# Patient Record
Sex: FEMALE | Race: BLACK OR AFRICAN AMERICAN | NOT HISPANIC OR LATINO | Employment: FULL TIME | URBAN - METROPOLITAN AREA
[De-identification: names, ages, dates, MRNs, and addresses within clinical notes are randomized per-mention and may not be internally consistent; named-entity substitution may affect disease eponyms.]

---

## 2017-01-17 ENCOUNTER — HISTORICAL (OUTPATIENT)
Dept: RADIOLOGY | Facility: HOSPITAL | Age: 47
End: 2017-01-17

## 2017-08-15 ENCOUNTER — HISTORICAL (OUTPATIENT)
Dept: ADMINISTRATIVE | Facility: HOSPITAL | Age: 47
End: 2017-08-15

## 2017-08-15 LAB
APPEARANCE, UA: ABNORMAL
BACTERIA #/AREA URNS AUTO: ABNORMAL /HPF
BILIRUB UR QL STRIP: NEGATIVE
COLOR UR: YELLOW
GLUCOSE (UA): NEGATIVE
HGB UR QL STRIP: ABNORMAL
KETONES UR QL STRIP: NEGATIVE
LEUKOCYTE ESTERASE UR QL STRIP: ABNORMAL
NITRITE UR QL STRIP.AUTO: NEGATIVE
PH UR STRIP: 5.5 [PH] (ref 5–9)
PROT UR QL STRIP: NEGATIVE
RBC #/AREA URNS HPF: 28 /HPF (ref 0–2)
SP GR UR STRIP: 1.02 (ref 1–1.03)
SQUAMOUS EPITHELIAL, UA: 30 /HPF (ref 0–4)
UROBILINOGEN UR STRIP-ACNC: 0.2
WBC #/AREA URNS AUTO: 19 /HPF (ref 0–3)

## 2017-09-05 ENCOUNTER — HISTORICAL (OUTPATIENT)
Dept: ADMINISTRATIVE | Facility: HOSPITAL | Age: 47
End: 2017-09-05

## 2017-09-05 LAB
APPEARANCE, UA: CLEAR
BACTERIA SPEC CULT: ABNORMAL /HPF
BILIRUB UR QL STRIP: NEGATIVE
COLOR UR: YELLOW
GLUCOSE (UA): NEGATIVE
HGB UR QL STRIP: NEGATIVE
KETONES UR QL STRIP: NEGATIVE
LEUKOCYTE ESTERASE UR QL STRIP: NEGATIVE
NITRITE UR QL STRIP: NEGATIVE
PH UR STRIP: 6.5 [PH] (ref 5–9)
PROT UR QL STRIP: NEGATIVE
RBC #/AREA URNS HPF: 6 /HPF (ref 0–2)
SP GR UR STRIP: 1.03 (ref 1–1.03)
SQUAMOUS EPITHELIAL, UA: ABNORMAL
UROBILINOGEN UR STRIP-ACNC: 1
WBC #/AREA URNS HPF: ABNORMAL /[HPF]

## 2018-01-02 ENCOUNTER — HISTORICAL (OUTPATIENT)
Dept: ADMINISTRATIVE | Facility: HOSPITAL | Age: 48
End: 2018-01-02

## 2018-02-06 ENCOUNTER — HISTORICAL (OUTPATIENT)
Dept: PREADMISSION TESTING | Facility: HOSPITAL | Age: 48
End: 2018-02-06

## 2018-02-06 LAB
BUN SERPL-MCNC: 10 MG/DL (ref 7–18)
CALCIUM SERPL-MCNC: 9 MG/DL (ref 8.5–10.1)
CHLORIDE SERPL-SCNC: 107 MMOL/L (ref 98–107)
CO2 SERPL-SCNC: 26 MMOL/L (ref 21–32)
CREAT SERPL-MCNC: 0.62 MG/DL (ref 0.55–1.02)
GLUCOSE SERPL-MCNC: 77 MG/DL (ref 74–106)
POTASSIUM SERPL-SCNC: 4.2 MMOL/L (ref 3.5–5.1)
SODIUM SERPL-SCNC: 139 MMOL/L (ref 136–145)

## 2018-02-19 ENCOUNTER — HISTORICAL (OUTPATIENT)
Dept: SURGERY | Facility: HOSPITAL | Age: 48
End: 2018-02-19

## 2018-02-19 LAB — POC BETA-HCG (QUAL): NEGATIVE

## 2018-07-10 ENCOUNTER — HISTORICAL (OUTPATIENT)
Dept: ADMINISTRATIVE | Facility: HOSPITAL | Age: 48
End: 2018-07-10

## 2018-07-10 LAB
APPEARANCE, UA: CLEAR
BACTERIA #/AREA URNS AUTO: ABNORMAL /HPF
BILIRUB UR QL STRIP: NEGATIVE
COLOR UR: ABNORMAL
GLUCOSE (UA): NEGATIVE
HGB UR QL STRIP: NEGATIVE
KETONES UR QL STRIP: ABNORMAL
LEUKOCYTE ESTERASE UR QL STRIP: ABNORMAL
NITRITE UR QL STRIP.AUTO: NEGATIVE
PH UR STRIP: 5 [PH] (ref 5–9)
PROT UR QL STRIP: ABNORMAL
RBC #/AREA URNS HPF: ABNORMAL /[HPF]
SP GR UR STRIP: 1.02 (ref 1–1.03)
SQUAMOUS EPITHELIAL, UA: ABNORMAL
UROBILINOGEN UR STRIP-ACNC: 1
WBC #/AREA URNS AUTO: 5 /HPF (ref 0–3)

## 2018-07-12 LAB — FINAL CULTURE: NO GROWTH

## 2018-07-23 ENCOUNTER — HISTORICAL (OUTPATIENT)
Dept: ADMINISTRATIVE | Facility: HOSPITAL | Age: 48
End: 2018-07-23

## 2018-07-23 LAB
APPEARANCE, UA: CLEAR
BACTERIA #/AREA URNS AUTO: ABNORMAL /HPF
BILIRUB UR QL STRIP: NEGATIVE
COLOR UR: YELLOW
GLUCOSE (UA): NEGATIVE
HGB UR QL STRIP: ABNORMAL
KETONES UR QL STRIP: NEGATIVE
LEUKOCYTE ESTERASE UR QL STRIP: ABNORMAL
NITRITE UR QL STRIP.AUTO: NEGATIVE
PH UR STRIP: 5.5 [PH] (ref 5–9)
PROT UR QL STRIP: NEGATIVE
RBC #/AREA URNS HPF: ABNORMAL /[HPF]
SP GR UR STRIP: 1.02 (ref 1–1.03)
SQUAMOUS EPITHELIAL, UA: ABNORMAL
UROBILINOGEN UR STRIP-ACNC: 0.2
WBC #/AREA URNS AUTO: ABNORMAL /HPF (ref 0–3)

## 2018-07-25 LAB — FINAL CULTURE: NORMAL

## 2019-02-19 ENCOUNTER — HISTORICAL (OUTPATIENT)
Dept: ADMINISTRATIVE | Facility: HOSPITAL | Age: 49
End: 2019-02-19

## 2019-05-02 ENCOUNTER — HISTORICAL (OUTPATIENT)
Dept: RADIOLOGY | Facility: HOSPITAL | Age: 49
End: 2019-05-02

## 2019-10-30 ENCOUNTER — HISTORICAL (OUTPATIENT)
Dept: ADMINISTRATIVE | Facility: HOSPITAL | Age: 49
End: 2019-10-30

## 2022-05-02 NOTE — HISTORICAL [facility]
This is a historical note converted from Kate. Formatting and pictures may have been removed.  Please reference Kate for original formatting and attached multimedia. Chief Complaint  Pain right shoulder/arm  History of Present Illness  This 47-year-old comes in complaining of right shoulder pain. ?She has had intermittent pain but it became worse after?painting. ?She gives a good description of impingement tendinitis and some weakness.? She is also complaining of right carpal tunnel syndrome.? She is right-hand dominant.? She is a hairdresser.  Review of Systems  Constitutional: No fever, weakness, or fatigue.  Ear/Nose/Mouth/Throat: No nasal congestion or sore throat.  Respiratory: No shortness of breath or cough.  Cardiovascular: No chest pain, palpitations, or peripheral edema.  Gastrointestinal: No nausea, vomiting, or abdominal pain.  Genitourinary: No dysuria.  Musculoskeletal: See current complaints  Integumentary: Negative.  Physical Exam  Vitals & Measurements  HR:?89?(Peripheral)? BP:?138/83?  HT:?173?cm? HT:?173?cm? WT:?95.3?kg? WT:?95.3?kg? BMI:?31.84?  Physical examination shows that she is tender over the supraspinatus insertion as well as the AC joint.? Forward flexion is 120? and abduction is 90?. ?She has positive impingement tendinitis symptoms.? She has weakness in external rotation but not abduction.?  She has no biceps tendon pain.? Examination of her right hand shows that she has thenar atrophy.? She has a positive Phalens and positive Tinels for right carpal tunnel syndrome.? She has fair  strength.  ?  AP, axillary lateral, and scapular outlet view of the right shoulder shows that the patient has?a type II acromion with subacromial spurring as well as AC joint arthritis.  Assessment/Plan  1.?Right shoulder pain  ?The findings were reviewed with the patient and she expressed understanding.? Discussed options for treatment.? The patient opted for an injection.? The patients right  subacromial space was injected with dexamethasone 1 mL under sterile conditions. ?The patient tolerated the injection without difficulty. The patient requested a muscle relaxant.? I will see her back in 1 week for recheck. ?If her strength does not improve I would recommend an MRI.? She requested no pain medication.? She is instructed to call if she has any problems prior to her next appointment.  Ordered:  cyclobenzaprine, 5 mg = 1 tab(s), Oral, TID, X 14 day(s), # 42 tab(s), 0 Refill(s), Pharmacy: [id number]  dexamethasone, 4 mg, Intra-Articular, Once, first dose 01/02/18 14:00:00 CST, stop date 01/02/18 14:00:00 CST, 24  asp/inj jnt/bursa, major [id number] , 01/02/18 13:49:00 CST, RT, [facility] [place], Routine, 01/02/18 13:49:00 CST  Clinic Follow up, *Est. 01/09/18 3:00:00 CST, Order for future visit, Right shoulder pain  Impingement syndrome of right shoulder  Sprain of right rotator cuff capsule, initial encounter  Right carpal tunnel syndrome, [facility] [place]  Office/Outpatient Visit Level 3 Established [id number] , Right shoulder pain  Impingement syndrome of right shoulder  Sprain of right rotator cuff capsule, initial encounter  Right carpal tunnel syndrome, [facility] [place], 01/02/18 13:49:00 CST  XR Shoulder Right Minimum 2 Views, Routine, 01/02/18 11:34:00 CST, Pain, None, Stretcher, Patient Has IV?, Rad Type, Right shoulder pain, Not Scheduled, 01/02/18 11:34:00 CST  ?  2.?Impingement syndrome of right shoulder  Ordered:  cyclobenzaprine, 5 mg = 1 tab(s), Oral, TID, X 14 day(s), # 42 tab(s), 0 Refill(s), Pharmacy: [id number]  dexamethasone, 4 mg, Intra-Articular, Once, first dose 01/02/18 14:00:00 CST, stop date 01/02/18 14:00:00 CST, 24  asp/inj jnt/bursa, major [id number] PC, 01/02/18 13:49:00 CST, RT, [facility], Routine, 01/02/18 13:49:00 CST  Clinic Follow up, *Est. 01/09/18 3:00:00 CST, Order for future visit, Right shoulder pain  Impingement syndrome of right  shoulder  Sprain of right rotator cuff capsule, initial encounter  Right carpal tunnel syndrome, [facility] [place]  Office/Outpatient Visit Level 3 Established [id number] PC, Right shoulder pain  Impingement syndrome of right shoulder  Sprain of right rotator cuff capsule, initial encounter  Right carpal tunnel syndrome, [facility] [place], 01/02/18 13:49:00 CST  ?  3.?Sprain of right rotator cuff capsule, initial encounter  Ordered:  cyclobenzaprine, 5 mg = 1 tab(s), Oral, TID, X 14 day(s), # 42 tab(s), 0 Refill(s), Pharmacy: [facility] #[id number]  dexamethasone, 4 mg, Intra-Articular, Once, first dose 01/02/18 14:00:00 CST, stop date 01/02/18 14:00:00 CST, 24  asp/inj jnt/bursa, major [id number] , 01/02/18 13:49:00 CST, RT, [facility] [place], Routine, 01/02/18 13:49:00 CST  Clinic Follow up, *Est. 01/09/18 3:00:00 CST, Order for future visit, Right shoulder pain  Impingement syndrome of right shoulder  Sprain of right rotator cuff capsule, initial encounter  Right carpal tunnel syndrome, [facility] [place]  Office/Outpatient Visit Level 3 Established [id number] PC, Right shoulder pain  Impingement syndrome of right shoulder  Sprain of right rotator cuff capsule, initial encounter  Right carpal tunnel syndrome, [facility] [place], 01/02/18 13:49:00 CST  ?  4.?Right carpal tunnel syndrome  Ordered:  cyclobenzaprine, 5 mg = 1 tab(s), Oral, TID, X 14 day(s), # 42 tab(s), 0 Refill(s), Pharmacy: [facility] #[id number]  dexamethasone, 4 mg, Intra-Articular, Once, first dose 01/02/18 14:00:00 CST, stop date 01/02/18 14:00:00 CST, 24  asp/inj jnt/bursa, major [id number] PC, 01/02/18 13:49:00 CST, RT, [facility] [place], Routine, 01/02/18 13:49:00 CST  Clinic Follow up, *Est. 01/09/18 3:00:00 CST, Order for future visit, Right shoulder pain  Impingement syndrome of right shoulder  Sprain of right rotator cuff capsule, initial encounter  Right carpal tunnel syndrome, [facility]  Office/Outpatient Visit Level 3  Established [id number] PC, Right shoulder pain  Impingement syndrome of right shoulder  Sprain of right rotator cuff capsule, initial encounter  Right carpal tunnel syndrome, [facility], 01/02/18 13:49:00 CST  ?   Problem List/Past Medical History  Ongoing  Impingement syndrome of right shoulder  Obesity  Right carpal tunnel syndrome  Right shoulder pain  Sprain of right rotator cuff capsule, initial encounter  Trigger finger  Historical  Procedure/Surgical History  Release Right Hand Tendon, Open Approach (06/27/2016)  Release Trigger Finger Or Thumb (Right) (06/27/2016)  Tendon sheath incision (eg, for trigger finger) (06/27/2016)  gastric sleeve (01/01/2013)  breast reduction (01/01/1998)  bunionectomy (01/01/1990)  liposcution  Medications  aspirin 81 mg oral tablet, 81 mg= 1 tab(s), Oral, Daily  CeleBREX 200 mg oral capsule, 200 mg= 1 cap(s), Oral, BID  dexamethasone, 4 mg, Intra-Articular, Once  Flexeril 5 mg oral tablet, 5 mg= 1 tab(s), Oral, TID  hydrochlorothiazide-triamterene 25 mg-37.5 mg oral capsule, 1 cap(s), Oral, Daily  PriLOSEC, 20 mg, Oral, Daily  Allergies  No Known Allergies  Social History  Alcohol - 06/21/2016  Current, Liquor, 1-2 times per month  Employment/School - 06/21/2016  Employed, Work/School description: . Highest education level: High school.  Home/Environment - 06/21/2016  Lives with Spouse. Living situation: Home/Independent. Home equipment: hearing aid. Alcohol abuse in household: No. Substance abuse in household: No. Smoker in household: No. Injuries/Abuse/Neglect in household: No.  Tobacco - 06/21/2016  Former smoker, Cigarettes  Family History  Pancreatic cancer: Father.

## 2022-05-02 NOTE — HISTORICAL [facility]
This is a historical note converted from Kate. Formatting and pictures may have been removed.  Please reference Kate for original formatting and attached multimedia. Chief Complaint  Est patient here c/o agata knee pain Left is worse and Left hip pain that goes into groin. No injury. Xrays today.  History of Present Illness  This 49-year-old is complaining of primarily left hip pain.? She states that she also has some mild bilateral knee pain but really her left pain and some more severe?issue.? The patient is complaining of pain in her groin that radiates down the medial side of her leg.? She continues to work as a stylist.  Review of Systems  Constitutional: No fever, weakness, or fatigue.  Ear/Nose/Mouth/Throat: No nasal congestion or sore throat.  Respiratory: No shortness of breath or cough.  Cardiovascular: No chest pain, palpitations, or peripheral edema.  Gastrointestinal: No nausea, vomiting, or abdominal pain.  Genitourinary: No dysuria.  Musculoskeletal: See current complaints  Integumentary: Negative.  Physical Exam  Vitals & Measurements  HR:?70(Peripheral)? BP:?124/84?  HT:?172?cm? WT:?98.2?kg? BMI:?33.19?  Physical examination shows that the patient walks with a minimally antalgic gait.? Examination of her knee show that they are cool to the touch.? She has no effusion. ?Range of motion of both knees 0-120 degrees.? She has mild patellofemoral crepitance.? She has no evidence of ligamentous laxity or meniscal pathology.? She has good patella tracking.? She is motor and sensory intact.? Examination of her left hip shows that the patients range of motion is flexion of 90 degrees,?extension of 0 degrees,?abduction of 40 degrees,?internal rotation of 0 degrees and external rotation of 30 degrees.? She has pain with internal rotation and flexion.? She also has pain with abduction.? She has no evidence of instability.? She appears to be motor and sensory intact.  ?  Standing AP, lateral, sunrise, and  tunnel views show that the patient has a flat?tibial slope but otherwise has no evidence of acute or chronic bony pathology in her knees.  ?  Standing AP pelvis with?lateral of the left hip shows that the patient has true femoral acetabular impingement?with?very large osteophyte on the left acetabulum that is causing erosions in the femoral head.? On the right acetabulum she has a smaller osteophyte and also has a broken osteophyte?in this area.? Changes on the femoral head are minimal.  Assessment/Plan  1.?Femoral acetabular impingement?M25.859  ? The findings were reviewed with the patient and she expressed understanding.? I think this is a true clinically significant case of femoral acetabular impingement. ?I would recommend?referral to my partner Dr. Louis for evaluation and treatment. ?The patient is in agreement.? She is instructed to call if she needs anything further.  Ordered:  1036F Current tobacco non-user, 02/19/19 15:44:00 CST  1125F Pain severity quantified, pain present, 02/19/19 15:44:00 CST  1170F Functional Status Assessment, 02/19/19 15:44:00 CST  3008F Body Mass Index (BMI), documented, 02/19/19 15:44:00 CST  3074F Most recent systolic blood pressure, less than 130 mm Hg, 02/19/19 15:44:00 CST  3079F Most recent diastolic blood pressure, 80 - 89 mm Hg, 02/19/19 15:44:00 CST  3725F Screening for depression performed, 02/19/19 15:44:00 CST  Office/Outpatient Visit Level 3 Established 98129 PC, Femoral acetabular impingement  Left hip pain  Left knee pain  Right knee pain, [facility], 02/19/19 15:44:00 CST  ?  2.?Left hip pain?M25.552  Ordered:  1036F Current tobacco non-user, 02/19/19 15:44:00 CST  1125F Pain severity quantified, pain present, 02/19/19 15:44:00 CST  1170F Functional Status Assessment, 02/19/19 15:44:00 CST  3008F Body Mass Index (BMI), documented, 02/19/19 15:44:00 CST  3074F Most recent systolic blood pressure, less than 130 mm Hg, 02/19/19 15:44:00 CST  3079F  Most recent diastolic blood pressure, 80 - 89 mm Hg, 02/19/19 15:44:00 CST  3725F Screening for depression performed, 02/19/19 15:44:00 CST  Office/Outpatient Visit Level 3 Established 93030 PC, Femoral acetabular impingement  Left hip pain  Left knee pain  Right knee pain, [facility], 02/19/19 15:44:00 CST  ?  3.?Left knee pain?M25.562  Ordered:  1036F Current tobacco non-user, 02/19/19 15:44:00 CST  1125F Pain severity quantified, pain present, 02/19/19 15:44:00 CST  1170F Functional Status Assessment, 02/19/19 15:44:00 CST  3008F Body Mass Index (BMI), documented, 02/19/19 15:44:00 CST  3074F Most recent systolic blood pressure, less than 130 mm Hg, 02/19/19 15:44:00 CST  3079F Most recent diastolic blood pressure, 80 - 89 mm Hg, 02/19/19 15:44:00 CST  3725F Screening for depression performed, 02/19/19 15:44:00 CST  Office/Outpatient Visit Level 3 Established 79427 PC, Femoral acetabular impingement  Left hip pain  Left knee pain  Right knee pain, [facility], 02/19/19 15:44:00 CST  ?  4.?Right knee pain?M25.561  Ordered:  1036F Current tobacco non-user, 02/19/19 15:44:00 CST  1125F Pain severity quantified, pain present, 02/19/19 15:44:00 CST  1170F Functional Status Assessment, 02/19/19 15:44:00 CST  3008F Body Mass Index (BMI), documented, 02/19/19 15:44:00 CST  3074F Most recent systolic blood pressure, less than 130 mm Hg, 02/19/19 15:44:00 CST  3079F Most recent diastolic blood pressure, 80 - 89 mm Hg, 02/19/19 15:44:00 CST  3725F Screening for depression performed, 02/19/19 15:44:00 CST  Office/Outpatient Visit Level 3 Established 31375 PC, Femoral acetabular impingement  Left hip pain  Left knee pain  Right knee pain, [facility], 02/19/19 15:44:00 CST  ?  Orders:  Clinic Follow-up PRN, 02/19/19 15:44:00 CST, Future Order, [facility]   Problem List/Past Medical History  Ongoing  Acid reflux  Arthritis  DVT - Deep vein thrombosis  Femoral acetabular  impingement  Hypertension  Impingement syndrome of right shoulder  Left hip pain  Obesity  Orthopedic aftercare  Right carpal tunnel syndrome  Right knee pain  Right shoulder pain  Sprain of right rotator cuff capsule, initial encounter  Trigger finger  Historical  No qualifying data  Procedure/Surgical History  Rotator Cuff Repair (Right) (02/19/2018)  Shoulder Decompression (Right) (02/19/2018)  Release Trigger Finger Or Thumb (Right) (06/27/2016)  gastric sleeve (01/01/2013)  breast reduction (01/01/1998)  bunionectomy (01/01/1990)  liposcution   Medications  aspirin 81 mg oral tablet, 81 mg= 1 tab(s), Oral, Daily  PriLOSEC, 20 mg, Oral, Daily  Allergies  No Known Allergies  Social History  Alcohol  Current, Liquor, 1-2 times per month, 06/21/2016  Employment/School  Employed, Work/School description: . Highest education level: High school., 06/21/2016  Home/Environment  Lives with Spouse. Living situation: Home/Independent. Home equipment: hearing aid. Alcohol abuse in household: No. Substance abuse in household: No. Smoker in household: No. Injuries/Abuse/Neglect in household: No., 06/21/2016  Tobacco  Former smoker, quit more than 30 days ago, No, 02/19/2019  Former smoker, Cigarettes, 06/21/2016  Family History  Hypertension.: Mother.  Pancreatic cancer: Father.  Health Maintenance  Health Maintenance  ???Pending?(in the next year)  ??? ??OverDue  ??? ? ? ?Diabetes Screening due??and every?  ??? ? ? ?Hypertension Management-BMP due??02/06/19??and every 1??year(s)  ??? ??Due?  ??? ? ? ?ADL Screening due??02/20/19??and every 1??year(s)  ??? ? ? ?Alcohol Misuse Screening due??02/20/19??and every 1??year(s)  ??? ? ? ?Hypertension Maintenance-Medication Prescribed due??02/20/19??and every 1??year(s)  ??? ? ? ?Hypertension Management-Education due??02/20/19??and every 1??year(s)  ??? ? ? ?Lipid Screening due??02/20/19??and every?  ??? ? ? ?Smoking Cessation due??02/20/19??Variable frequency  ??? ? ?  ?Tetanus Vaccine due??02/20/19??and every 10??year(s)  ??? ??Due In Future?  ??? ? ? ?Blood Pressure Screening not due until??02/19/20??and every 1??year(s)  ??? ? ? ?Body Mass Index Check not due until??02/19/20??and every 1??year(s)  ??? ? ? ?Depression Screening not due until??02/19/20??and every 1??year(s)  ??? ? ? ?Hypertension Management-Blood Pressure not due until??02/19/20??and every 1??year(s)  ??? ? ? ?Obesity Screening not due until??02/19/20??and every 1??year(s)  ???Satisfied?(in the past 1 year)  ??? ??Satisfied?  ??? ? ? ?Blood Pressure Screening on??02/19/19.??Satisfied by Jakobeg, Lauren L. L.  ??? ? ? ?Body Mass Index Check on??02/19/19.??Satisfied by Sarah, Lauren L. L.  ??? ? ? ?Cervical Cancer Screening on??08/20/18.??Satisfied by Solange Woodward  ??? ? ? ?Depression Screening on??02/19/19.??Satisfied by Sarah, Lauren L. L.  ??? ? ? ?Hypertension Management-Blood Pressure on??02/19/19.??Satisfied by Jakobeg, Lauren L. L.  ??? ? ? ?Influenza Vaccine on??02/19/19.??Satisfied by Nineryeg, Lauren L. L.  ??? ? ? ?Obesity Screening on??02/19/19.??Satisfied by Nineryeg, Lauren L. L.  ?  ?

## 2022-05-02 NOTE — HISTORICAL [facility]
This is a historical note converted from Kate. Formatting and pictures may have been removed.  Please reference Kate for original formatting and attached multimedia. Chief Complaint  PT HERE TODAY FOR LEFT SHOULDER PAIN, NO INJURY, ?X-RAY TODAY....CV  History of Present Illness  This 49-year-old comes in for her left shoulder?I have treated her for right rotator cuff?tear in the past. ?She is right-hand dominant. ?She works as a .? The patient states that she is now developing constant pain and some weakness.  Review of Systems  Constitutional: No fever, weakness, or fatigue.  Ear/Nose/Mouth/Throat: No nasal congestion or sore throat.  Respiratory: No shortness of breath or cough.  Cardiovascular: No chest pain, palpitations, or peripheral edema.  Gastrointestinal: No nausea, vomiting, or abdominal pain.  Genitourinary: No dysuria.  Musculoskeletal: See current complaints  Integumentary: Negative.  Physical Exam  Vitals & Measurements  T:?36.6? ?C (Oral)? HR:?65(Peripheral)? BP:?126/80?  HT:?172?cm? WT:?98.2?kg? BMI:?33.19?  Physical examination shows that she has palpable osteophytes at her AC joint.? Patient has significant crepitance with?range of motion and pain with?provocative testing for supraspinatus and infraspinatus impingement tendinitis.? She has pain with AC joint compression. ?She has pain along her biceps tendon. ?She has moderate weakness in abduction and external rotation.? Active range of motion is forward flexion of 90 degrees and abduction of 90 degrees.? Extension is 60 degrees.? Internal rotation is to L4. ?She appears to be motor and sensory intact. ?She has no evidence of instability.  ?  AP, axillary lateral, and scapular outlet view of the left shoulder shows that the patient has a type II acromion with no significant subacromial spurring. ?However she has significant AC joint arthritis with spurring and cystic changes.  Assessment/Plan  1.?Primary osteoarthritis, left  shoulder?M19.012  ?The findings were reviewed with the patient and she expressed understanding. ?We discussed options for treatment. ?The patient opted for an injection today.? The patients left subacromial space was injected with dexamethasone 1 mL under sterile conditions. ?The patient tolerated the injection without difficulty.? The patient will be sent for an MRI to rule out?rotator cuff tear. ?I will see her back after her MRI is?completed. ?She is instructed to call if she has any problems prior to her next appointment.  Ordered:  dexamethasone, 4 mg, Intra-Articular, Once, first dose 10/30/19 10:00:00 CDT, stop date 10/30/19 10:00:00 CDT  Clinic Follow up, *Est. 11/06/19 3:00:00 CST, Order for future visit, Primary osteoarthritis, left shoulder  Sprain of left rotator cuff capsule, [facility]  MRI Ext Upper Joint Left W/O Contrast, Routine, *Est. 10/31/19 3:00:00 CDT, Rotator Cuff Syndrome, None, Ambulatory, Patient Has IV?, Rad Type, Order for future visit, Primary osteoarthritis, left shoulder  Sprain of left rotator cuff capsule, Schedule this test, Outside Facility, *Est. 1...  Office/Outpatient Visit Level 3 [facility] [id number] [facility], Primary osteoarthritis, left shoulder  Sprain of left rotator cuff capsule, [facility], 10/30/19 9:53:00 CDT  ?  2.?Sprain of left rotator cuff capsule?S43.422A  Ordered:  dexamethasone, 4 mg, Intra-Articular, Once, first dose 10/30/19 10:00:00 CDT, stop date 10/30/19 10:00:00 CDT  Clinic Follow up, *Est. 11/06/19 3:00:00 CST, Order for future visit, Primary osteoarthritis, left shoulder  Sprain of left rotator cuff capsule, [facility]  MRI Ext Upper Joint Left W/O Contrast, Routine, *Est. 10/31/19 3:00:00 CDT, Rotator Cuff Syndrome, None, Ambulatory, Patient Has IV?, Rad Type, Order for future visit, Primary osteoarthritis, left shoulder  Sprain of left rotator cuff capsule, Schedule this test, Outside Facility, *Est. 1...  Office/Outpatient Visit Level 3 New  [id number] [facility], Primary osteoarthritis, left shoulder  Sprain of left rotator cuff capsule, [facility], 10/30/19 9:53:00 CDT  ?  Orders:  asp/inj jnt/bursa, major [id number] PC, 10/30/19 9:53:00 CDT, LT, [facility], Routine, 10/30/19 9:53:00 CDT  XR Shoulder Left Minimum 2 Views, Routine, 10/30/19 9:22:00 CDT, Pain, None, Stretcher, Patient Has IV?, Rad Type, Left shoulder pain, 10/30/19 9:22:00 CDT  Referrals  Clinic Follow up, *Est. 11/06/19 3:00:00 CST, Order for future visit, Primary osteoarthritis, left shoulder  [facility]   Problem List/Past Medical History  Ongoing  Acid reflux  Arthritis  DVT - Deep vein thrombosis  Eruption  Femoral acetabular impingement  Hypertension  Impingement syndrome of right shoulder  Left hip pain  Obesity  Orthopedic aftercare  Primary osteoarthritis, left shoulder  Right carpal tunnel syndrome  Right knee pain  Right shoulder pain  Sprain of left rotator cuff capsule  Sprain of right rotator cuff capsule, initial encounter  Trigger finger  Historical  No qualifying data  Procedure/Surgical History  Arthroscopy, shoulder, surgical; decompression of subacromial space with partial acromioplasty, with coracoacromial ligament (ie, arch) release, when performed (List separately in addition to code for primary procedure) (02/19/2018)  Arthroscopy, shoulder, surgical; with rotator cuff repair (02/19/2018)  Injection, anesthetic agent; brachial plexus, single (02/19/2018)  Introduction of Anesthetic Agent into Peripheral Nerves and Plexi, Percutaneous Approach (02/19/2018)  Release Right Shoulder Joint, Percutaneous Endoscopic Approach (02/19/2018)  Repair Right Shoulder Tendon, Percutaneous Endoscopic Approach (02/19/2018)  Rotator Cuff Repair (Right) (02/19/2018)  Shoulder Decompression (Right) (02/19/2018)  Release Right Hand Tendon, Open Approach (06/27/2016)  Release Trigger Finger Or Thumb (Right) (06/27/2016)  Tendon sheath  incision (eg, for trigger finger) (06/27/2016)  gastric sleeve (01/01/2013)  breast reduction (01/01/1998)  bunionectomy (01/01/1990)  liposcution   Medications  celecoxib 200 mg oral capsule  dexamethasone, 4 mg, Intra-Articular, Once  PriLOSEC, 20 mg, Oral, Daily  Zyrtec 10 mg oral tablet, 10 mg= 1 tab(s), Oral, Daily  Allergies  Adhesive Bandage?(Rash)  Social History  Abuse/Neglect  No, 10/30/2019  Alcohol  Current, Liquor, 1-2 times per month, 06/21/2016  Employment/School  Employed, Work/School description: . Highest education level: High school., 06/21/2016  Home/Environment  Lives with Spouse. Living situation: Home/Independent. Home equipment: hearing aid. Alcohol abuse in household: No. Substance abuse in household: No. Smoker in household: No. Injuries/Abuse/Neglect in household: No., 06/21/2016  Tobacco  Former smoker, quit more than 30 days ago, N/A, 10/30/2019  Family History  Hypertension.: Mother.  Pancreatic cancer: Father.  Health Maintenance  Health Maintenance  ???Pending?(in the next year)  ??? ??OverDue  ??? ? ? ?Diabetes Screening due??and every?  ??? ??Due?  ??? ? ? ?ADL Screening due??10/30/19??and every 1??year(s)  ??? ? ? ?Hypertension Maintenance-Medication Prescribed due??10/30/19??and every 1??year(s)  ??? ? ? ?Hypertension Management-Education due??10/30/19??and every 1??year(s)  ??? ? ? ?Influenza Vaccine due??10/30/19??and every?  ??? ? ? ?Tetanus Vaccine due??10/30/19??and every 10??year(s)  ??? ??Due In Future?  ??? ? ? ?Alcohol Misuse Screening not due until??01/01/20??and every 1??year(s)  ??? ? ? ?Obesity Screening not due until??01/01/20??and every 1??year(s)  ??? ? ? ?Depression Screening not due until??02/19/20??and every 1??year(s)  ??? ? ? ?Hypertension Management-BMP not due until??03/26/20??and every 1??year(s)  ??? ? ? ?Blood Pressure Screening not due until??10/29/20??and every 1??year(s)  ??? ? ? ?Body Mass Index Check not due until??10/29/20??and every  1??year(s)  ??? ? ? ?Hypertension Management-Blood Pressure not due until??10/29/20??and every 1??year(s)  ???Satisfied?(in the past 1 year)  ??? ??Satisfied?  ??? ? ? ?Alcohol Misuse Screening on??10/30/19.??Satisfied by Jud Phelps LPN  ??? ? ? ?Blood Pressure Screening on??10/30/19.??Satisfied by Jud Phelps LPN  ??? ? ? ?Body Mass Index Check on??10/30/19.??Satisfied by Jud Phelps LPN  ??? ? ? ?Depression Screening on??02/19/19.??Satisfied by Lauren Smith LAramis  ??? ? ? ?Hypertension Management-Blood Pressure on??10/30/19.??Satisfied by Jud Phelps LPN  ??? ? ? ?Influenza Vaccine on??02/19/19.??Satisfied by Lauren Smith LAramis LAramis  ??? ? ? ?Obesity Screening on??10/30/19.??Satisfied by Jud Phelps LPN  ?

## 2023-11-20 ENCOUNTER — OFFICE VISIT (OUTPATIENT)
Dept: ORTHOPEDICS | Facility: CLINIC | Age: 53
End: 2023-11-20
Payer: COMMERCIAL

## 2023-11-20 VITALS — HEIGHT: 69 IN | WEIGHT: 190 LBS | BODY MASS INDEX: 28.14 KG/M2

## 2023-11-20 DIAGNOSIS — M25.512 ACUTE PAIN OF LEFT SHOULDER: ICD-10-CM

## 2023-11-20 DIAGNOSIS — M75.122 COMPLETE TEAR OF LEFT ROTATOR CUFF, UNSPECIFIED WHETHER TRAUMATIC: ICD-10-CM

## 2023-11-20 DIAGNOSIS — M75.22 BICIPITAL TENDINITIS OF LEFT SHOULDER: ICD-10-CM

## 2023-11-20 DIAGNOSIS — M25.512 LEFT SHOULDER PAIN, UNSPECIFIED CHRONICITY: Primary | ICD-10-CM

## 2023-11-20 DIAGNOSIS — M75.42 SHOULDER IMPINGEMENT SYNDROME, LEFT: ICD-10-CM

## 2023-11-20 PROCEDURE — 1160F PR REVIEW ALL MEDS BY PRESCRIBER/CLIN PHARMACIST DOCUMENTED: ICD-10-PCS | Mod: CPTII,,, | Performed by: ORTHOPAEDIC SURGERY

## 2023-11-20 PROCEDURE — 20610 LARGE JOINT ASPIRATION/INJECTION: L SUBACROMIAL BURSA: ICD-10-PCS | Mod: LT,,, | Performed by: ORTHOPAEDIC SURGERY

## 2023-11-20 PROCEDURE — 3008F BODY MASS INDEX DOCD: CPT | Mod: CPTII,,, | Performed by: ORTHOPAEDIC SURGERY

## 2023-11-20 PROCEDURE — 20610 DRAIN/INJ JOINT/BURSA W/O US: CPT | Mod: LT,,, | Performed by: ORTHOPAEDIC SURGERY

## 2023-11-20 PROCEDURE — 1160F RVW MEDS BY RX/DR IN RCRD: CPT | Mod: CPTII,,, | Performed by: ORTHOPAEDIC SURGERY

## 2023-11-20 PROCEDURE — 3008F PR BODY MASS INDEX (BMI) DOCUMENTED: ICD-10-PCS | Mod: CPTII,,, | Performed by: ORTHOPAEDIC SURGERY

## 2023-11-20 PROCEDURE — 1159F PR MEDICATION LIST DOCUMENTED IN MEDICAL RECORD: ICD-10-PCS | Mod: CPTII,,, | Performed by: ORTHOPAEDIC SURGERY

## 2023-11-20 PROCEDURE — 99204 PR OFFICE/OUTPT VISIT, NEW, LEVL IV, 45-59 MIN: ICD-10-PCS | Mod: 25,,, | Performed by: ORTHOPAEDIC SURGERY

## 2023-11-20 PROCEDURE — 99204 OFFICE O/P NEW MOD 45 MIN: CPT | Mod: 25,,, | Performed by: ORTHOPAEDIC SURGERY

## 2023-11-20 PROCEDURE — 1159F MED LIST DOCD IN RCRD: CPT | Mod: CPTII,,, | Performed by: ORTHOPAEDIC SURGERY

## 2023-11-20 RX ORDER — MELOXICAM 15 MG/1
15 TABLET ORAL DAILY
COMMUNITY

## 2023-11-20 RX ORDER — LIDOCAINE HYDROCHLORIDE 20 MG/ML
3 INJECTION, SOLUTION INFILTRATION; PERINEURAL
Status: DISCONTINUED | OUTPATIENT
Start: 2023-11-20 | End: 2023-11-20 | Stop reason: HOSPADM

## 2023-11-20 RX ORDER — ASPIRIN 81 MG/1
81 TABLET ORAL DAILY
COMMUNITY

## 2023-11-20 RX ORDER — TIRZEPATIDE 7.5 MG/.5ML
7.5 INJECTION, SOLUTION SUBCUTANEOUS
COMMUNITY

## 2023-11-20 RX ORDER — METHYLPREDNISOLONE ACETATE 80 MG/ML
80 INJECTION, SUSPENSION INTRA-ARTICULAR; INTRALESIONAL; INTRAMUSCULAR; SOFT TISSUE
Status: DISCONTINUED | OUTPATIENT
Start: 2023-11-20 | End: 2023-11-20 | Stop reason: HOSPADM

## 2023-11-20 RX ORDER — OMEPRAZOLE 20 MG/1
TABLET, DELAYED RELEASE ORAL
COMMUNITY

## 2023-11-20 RX ADMIN — METHYLPREDNISOLONE ACETATE 80 MG: 80 INJECTION, SUSPENSION INTRA-ARTICULAR; INTRALESIONAL; INTRAMUSCULAR; SOFT TISSUE at 02:11

## 2023-11-20 RX ADMIN — LIDOCAINE HYDROCHLORIDE 3 MG: 20 INJECTION, SOLUTION INFILTRATION; PERINEURAL at 02:11

## 2023-11-20 NOTE — PROCEDURES
Large Joint Aspiration/Injection: L subacromial bursa    Date/Time: 11/20/2023 2:30 PM    Performed by: Tunde Rice MD  Authorized by: Tunde Rice MD    Consent Done?:  Yes (Verbal)  Indications:  Pain  Site marked: the procedure site was marked    Timeout: prior to procedure the correct patient, procedure, and site was verified    Prep: patient was prepped and draped in usual sterile fashion    Approach:  Posterior  Location:  Shoulder  Site:  L subacromial bursa  Medications:  3 mg LIDOcaine HCL 20 mg/ml (2%) 20 mg/mL (2 %); 80 mg methylPREDNISolone acetate 80 mg/mL  Patient tolerance:  Patient tolerated the procedure well with no immediate complications

## 2023-11-20 NOTE — PROGRESS NOTES
"Subjective:    CC: Pain of the Left Shoulder (L shoulder pain - previous R shoulder RTC with dr. Arriaga - pt states that she had a mri - pain is when she stays too still or moves too much. )       HPI:  Patient comes in today complaining of left shoulder pain.  Patient states it feels just like her right shoulder which required surgery.  She is been having pain with overhead activities specific motions, she states it is coming down into her arm, she denies any numbness or tingling she denies other complaints.    ROS: Refer to HPI for pertinent ROS. All other 12 point systems negative.    Objective:  Vitals:    11/20/23 1429   Weight: 86.2 kg (190 lb)   Height: 5' 9" (1.753 m)        Physical Exam:  Patient is well-nourished and well-developed, in no apparent distress, pleasant and cooperative. Examination of the left upper extremity compartments are soft and warm.  Skin is intact. There are no signs or symptoms of DVT or infection.   Patient is tender to palpation along the  anterior aspect, proximal biceps tendon .  Patient is able to forward flex and abduct to 140.  Positive [place] and Neers, positive empty can, negative drop arm test. Negative sulcus sign. Stable to stressing. Neurovascularly intact distally.    Images:  X-rays three views left shoulder demonstrate no obvious fracture or dislocation. Images Reviewed and discussed with patient.    Assessment:  1. Left shoulder pain, unspecified chronicity  - X-Ray Shoulder 2 or More Views Left; Future    2. Complete tear of left rotator cuff, unspecified whether traumatic  - Large Joint Aspiration/Injection: L subacromial bursa    3. Bicipital tendinitis of left shoulder  - Large Joint Aspiration/Injection: L subacromial bursa    4. Shoulder impingement syndrome, left  - Large Joint Aspiration/Injection: L subacromial bursa        Plan:  At this time we discussed her physical exam and x-ray findings.  We have discussed her previous MRI in 2019 demonstrating a " partial tear.  She remains be symptomatic for years later, she tolerated her steroid injection very well to left shoulder, we will return to shoulder exercises, I would like see back in 6 weeks to see how she is progressing, we have discussed an MRI she does not improve.    Follow UP: No follow-ups on file.    Large Joint Aspiration/Injection: L subacromial bursa    Date/Time: 11/20/2023 2:30 PM    Performed by: Tunde Rice MD  Authorized by: Tunde Rice MD    Consent Done?:  Yes (Verbal)  Indications:  Pain  Site marked: the procedure site was marked    Timeout: prior to procedure the correct patient, procedure, and site was verified    Prep: patient was prepped and draped in usual sterile fashion    Approach:  Posterior  Location:  Shoulder  Site:  L subacromial bursa  Medications:  3 mg LIDOcaine HCL 20 mg/ml (2%) 20 mg/mL (2 %); 80 mg methylPREDNISolone acetate 80 mg/mL  Patient tolerance:  Patient tolerated the procedure well with no immediate complications

## 2023-12-05 ENCOUNTER — TELEPHONE (OUTPATIENT)
Dept: ORTHOPEDICS | Facility: CLINIC | Age: 53
End: 2023-12-05
Payer: COMMERCIAL

## 2023-12-05 NOTE — TELEPHONE ENCOUNTER
Patient called to see when she could have her mri at [facility]. Order was sent to [facility]. Attempted to call patient; no answer.

## 2023-12-06 NOTE — TELEPHONE ENCOUNTER
Spoke to patient and confirmed that she [facility]. Changed [facility].

## 2023-12-11 ENCOUNTER — TELEPHONE (OUTPATIENT)
Dept: ORTHOPEDICS | Facility: CLINIC | Age: 53
End: 2023-12-11
Payer: COMMERCIAL

## 2023-12-11 RX ORDER — DIAZEPAM 5 MG/1
5 TABLET ORAL EVERY 6 HOURS PRN
COMMUNITY
End: 2023-12-12 | Stop reason: SDUPTHER

## 2023-12-12 RX ORDER — DIAZEPAM 5 MG/1
TABLET ORAL
Qty: 2 TABLET | Refills: 0 | Status: SHIPPED | OUTPATIENT
Start: 2023-12-12

## 2023-12-20 ENCOUNTER — TELEPHONE (OUTPATIENT)
Dept: ORTHOPEDICS | Facility: CLINIC | Age: 53
End: 2023-12-20
Payer: COMMERCIAL

## 2023-12-20 NOTE — TELEPHONE ENCOUNTER
Patient called in regards to her upcoming MRI on 1/9/24.    Called patient back: pt stated that her Right shoulder is starting to feel just like her left. She requested a MRI on her right shoulder. Advised that he wouldn't order a mri unless he sees her for it. Appt made for 1/8/24.    Pt verbalized understanding and will call with any questions or concerns.

## 2023-12-27 NOTE — TELEPHONE ENCOUNTER
Spoke to patient regarding her R shoulder Mri. Advised that unfortunately we can not order a mri for that shoulder. Insurances usually do not cover having both done at the same time.     Advised that we can discuss her right shoulder at her appt on 1/10/24.    Appt cancelled for 1/8/24.    Pt verbalized understanding and will call with any questions or concerns.

## 2024-01-10 ENCOUNTER — OFFICE VISIT (OUTPATIENT)
Dept: ORTHOPEDICS | Facility: CLINIC | Age: 54
End: 2024-01-10
Payer: COMMERCIAL

## 2024-01-10 VITALS — WEIGHT: 190 LBS | BODY MASS INDEX: 28.14 KG/M2 | HEIGHT: 69 IN

## 2024-01-10 DIAGNOSIS — M75.22 BICIPITAL TENDINITIS OF LEFT SHOULDER: Primary | ICD-10-CM

## 2024-01-10 DIAGNOSIS — M75.42 SHOULDER IMPINGEMENT SYNDROME, LEFT: ICD-10-CM

## 2024-01-10 DIAGNOSIS — M19.019 OSTEOARTHRITIS OF AC (ACROMIOCLAVICULAR) JOINT: ICD-10-CM

## 2024-01-10 DIAGNOSIS — M75.112 PARTIAL NONTRAUMATIC RUPTURE OF LEFT ROTATOR CUFF: ICD-10-CM

## 2024-01-10 PROCEDURE — 1160F RVW MEDS BY RX/DR IN RCRD: CPT | Mod: CPTII,,, | Performed by: ORTHOPAEDIC SURGERY

## 2024-01-10 PROCEDURE — 99213 OFFICE O/P EST LOW 20 MIN: CPT | Mod: ,,, | Performed by: ORTHOPAEDIC SURGERY

## 2024-01-10 PROCEDURE — 1159F MED LIST DOCD IN RCRD: CPT | Mod: CPTII,,, | Performed by: ORTHOPAEDIC SURGERY

## 2024-01-10 PROCEDURE — 3008F BODY MASS INDEX DOCD: CPT | Mod: CPTII,,, | Performed by: ORTHOPAEDIC SURGERY

## 2024-01-10 NOTE — PROGRESS NOTES
"Subjective:    CC: Results of the Left Shoulder (L shoulder inj 11/20/23 - MRI results - pt states that she tried doing the HEP and it made her pain worse so she stopped doing the exercises. She states that her bicep was hurting more than anything. )       HPI:  Patient returns today for repeat exam.  Patient states she had an injection about 6 weeks ago, he would relief.  She has been trying some home therapy exercises, though made it worse.  She has pain in various spots of her shoulder.  She did have a previous MRI we have discussed her results.    ROS: Refer to HPI for pertinent ROS. All other 12 point systems negative.    Objective:  Vitals:    01/10/24 1047   Weight: 86.2 kg (190 lb)   Height: 5' 9" (1.753 m)        Physical Exam:  Patient is well-nourished and well-developed, in no apparent distress, pleasant and cooperative. Examination of the left upper extremity compartments are soft and warm.  Skin is intact. There are no signs or symptoms of DVT or infection.   Patient is tender to palpation along the  anterior aspect as well as posteriorly, occasionally laterally going down to the mid forearm region .  Patient is able to forward flex and abduct to 130.  Positive [place] and Neers, positive empty can, negative drop arm test. Negative sulcus sign. Stable to stressing. Neurovascularly intact distally.    Images: . Images Reviewed and discussed with patient.    Assessment:  1. Bicipital tendinitis of left shoulder    2. Shoulder impingement syndrome, left  - Ambulatory referral/consult to Physical/Occupational Therapy; Future    3. Osteoarthritis of AC (acromioclavicular) joint  - Ambulatory referral/consult to Physical/Occupational Therapy; Future    4. Partial nontraumatic rupture of left rotator cuff  - Ambulatory referral/consult to Physical/Occupational Therapy; Future        Plan:  At this time we discussed her physical exam and previous MRI findings.  We have discussed various treatment options " including conservative treatment surgical intervention.  We have discussed the down time rehab process afterwards.  We will start formal therapy, she will call if she would like to proceed with surgical intervention otherwise I would plan to see her back in 6 weeks to see how she is progressing.    Follow UP: No follow-ups on file.